# Patient Record
Sex: FEMALE | Race: WHITE
[De-identification: names, ages, dates, MRNs, and addresses within clinical notes are randomized per-mention and may not be internally consistent; named-entity substitution may affect disease eponyms.]

---

## 2020-06-10 ENCOUNTER — HOSPITAL ENCOUNTER (OUTPATIENT)
Dept: HOSPITAL 47 - LABPAT | Age: 55
Discharge: HOME | End: 2020-06-10
Attending: OBSTETRICS & GYNECOLOGY
Payer: COMMERCIAL

## 2020-06-10 DIAGNOSIS — Z01.818: Primary | ICD-10-CM

## 2020-06-10 DIAGNOSIS — D25.9: ICD-10-CM

## 2020-06-10 LAB
ANION GAP SERPL CALC-SCNC: 10 MMOL/L
BASOPHILS # BLD AUTO: 0 K/UL (ref 0–0.2)
BASOPHILS NFR BLD AUTO: 1 %
BUN SERPL-SCNC: 14 MG/DL (ref 7–17)
CHLORIDE SERPL-SCNC: 101 MMOL/L (ref 98–107)
CO2 SERPL-SCNC: 27 MMOL/L (ref 22–30)
EOSINOPHIL # BLD AUTO: 0 K/UL (ref 0–0.7)
EOSINOPHIL NFR BLD AUTO: 1 %
ERYTHROCYTE [DISTWIDTH] IN BLOOD BY AUTOMATED COUNT: 4.52 M/UL (ref 3.8–5.4)
ERYTHROCYTE [DISTWIDTH] IN BLOOD: 12.8 % (ref 11.5–15.5)
GLUCOSE SERPL-MCNC: 108 MG/DL (ref 74–99)
HCT VFR BLD AUTO: 42.3 % (ref 34–46)
HGB BLD-MCNC: 14.1 GM/DL (ref 11.4–16)
LYMPHOCYTES # SPEC AUTO: 1.4 K/UL (ref 1–4.8)
LYMPHOCYTES NFR SPEC AUTO: 23 %
MCH RBC QN AUTO: 31.2 PG (ref 25–35)
MCHC RBC AUTO-ENTMCNC: 33.3 G/DL (ref 31–37)
MCV RBC AUTO: 93.6 FL (ref 80–100)
MONOCYTES # BLD AUTO: 0.2 K/UL (ref 0–1)
MONOCYTES NFR BLD AUTO: 4 %
NEUTROPHILS # BLD AUTO: 4.2 K/UL (ref 1.3–7.7)
NEUTROPHILS NFR BLD AUTO: 70 %
PLATELET # BLD AUTO: 282 K/UL (ref 150–450)
POTASSIUM SERPL-SCNC: 4.4 MMOL/L (ref 3.5–5.1)
SODIUM SERPL-SCNC: 138 MMOL/L (ref 137–145)
WBC # BLD AUTO: 6 K/UL (ref 3.8–10.6)

## 2020-06-10 PROCEDURE — 36415 COLL VENOUS BLD VENIPUNCTURE: CPT

## 2020-06-10 PROCEDURE — 84520 ASSAY OF UREA NITROGEN: CPT

## 2020-06-10 PROCEDURE — 82947 ASSAY GLUCOSE BLOOD QUANT: CPT

## 2020-06-10 PROCEDURE — 87086 URINE CULTURE/COLONY COUNT: CPT

## 2020-06-10 PROCEDURE — 80051 ELECTROLYTE PANEL: CPT

## 2020-06-10 PROCEDURE — 85025 COMPLETE CBC W/AUTO DIFF WBC: CPT

## 2020-06-10 PROCEDURE — 82565 ASSAY OF CREATININE: CPT

## 2020-06-16 ENCOUNTER — HOSPITAL ENCOUNTER (OUTPATIENT)
Dept: HOSPITAL 47 - OR | Age: 55
Setting detail: OBSERVATION
LOS: 1 days | Discharge: HOME | End: 2020-06-17
Attending: OBSTETRICS & GYNECOLOGY | Admitting: OBSTETRICS & GYNECOLOGY
Payer: COMMERCIAL

## 2020-06-16 VITALS — BODY MASS INDEX: 30.2 KG/M2

## 2020-06-16 DIAGNOSIS — D25.9: Primary | ICD-10-CM

## 2020-06-16 DIAGNOSIS — I95.81: ICD-10-CM

## 2020-06-16 DIAGNOSIS — C55: ICD-10-CM

## 2020-06-16 DIAGNOSIS — Z87.891: ICD-10-CM

## 2020-06-16 DIAGNOSIS — N80.0: ICD-10-CM

## 2020-06-16 LAB
ERYTHROCYTE [DISTWIDTH] IN BLOOD BY AUTOMATED COUNT: 3.41 M/UL (ref 3.8–5.4)
ERYTHROCYTE [DISTWIDTH] IN BLOOD: 12.7 % (ref 11.5–15.5)
HCT VFR BLD AUTO: 32.3 % (ref 34–46)
HGB BLD-MCNC: 10.7 GM/DL (ref 11.4–16)
MCH RBC QN AUTO: 31.4 PG (ref 25–35)
MCHC RBC AUTO-ENTMCNC: 33.1 G/DL (ref 31–37)
MCV RBC AUTO: 94.8 FL (ref 80–100)
PLATELET # BLD AUTO: 275 K/UL (ref 150–450)
WBC # BLD AUTO: 14.8 K/UL (ref 3.8–10.6)

## 2020-06-16 PROCEDURE — 58260 VAGINAL HYSTERECTOMY: CPT

## 2020-06-16 PROCEDURE — 85027 COMPLETE CBC AUTOMATED: CPT

## 2020-06-16 PROCEDURE — 86901 BLOOD TYPING SEROLOGIC RH(D): CPT

## 2020-06-16 PROCEDURE — 88309 TISSUE EXAM BY PATHOLOGIST: CPT

## 2020-06-16 PROCEDURE — 86850 RBC ANTIBODY SCREEN: CPT

## 2020-06-16 PROCEDURE — 81025 URINE PREGNANCY TEST: CPT

## 2020-06-16 PROCEDURE — 86900 BLOOD TYPING SEROLOGIC ABO: CPT

## 2020-06-16 RX ADMIN — POTASSIUM CHLORIDE SCH MLS/HR: 14.9 INJECTION, SOLUTION INTRAVENOUS at 15:49

## 2020-06-16 RX ADMIN — ONDANSETRON PRN MG: 2 INJECTION INTRAMUSCULAR; INTRAVENOUS at 15:53

## 2020-06-16 RX ADMIN — POTASSIUM CHLORIDE SCH MLS: 14.9 INJECTION, SOLUTION INTRAVENOUS at 06:36

## 2020-06-16 NOTE — P.ANPRN
Procedure Note - Anesthesia





- Nerve Block Performed


  ** Other (see comment) Single


Date of Procedure: 06/16/20 (Spinal Duramorph/fentanyl)


Procedure Start Time: 06:53


Procedure Stop Time: 07:02


Location of Patient: PreOp


Indication: Acute Post-Operative Pain, Requested by Surgeon (Renny)


Sedation Type: Sedate with meaningful contact maintained


Preparation: Sterile Prep


Position: Sitting


Catheter: None


Needle Types: Other (see comment) (Eubanks)


Needle Gauge: Other (see comment) (25g)


Ultrasound used to visualize needle placement: No


Ultrasound used to observe medication spread: No


Injectate: Other (see comment) (300mcg duramorph, 25muc fentanyl)


Blood Aspirated: No


Pain Paresthesia on Injection Noted: No


Resistance on Injection: Normal


Image Stored and Saved: No


Events: Uneventful and Well Tolerated

## 2020-06-16 NOTE — P.OP
Date of Procedure: 20


Preoperative Diagnosis: 


Menorrhagia, fibroid uterus


Postoperative Diagnosis: 


Same, normal-appearing ovaries bilaterally


Procedure(s) Performed: 


Vaginal hysterectomy


Anesthesia: SALBADORA


Surgeon: Steffi Lawson


Assistant #1: Tanvir Lemus


Estimated Blood Loss (ml): 25


IV fluids (ml): 700


Urine output (ml): 125


Pathology: other (Cervix and uterus)


Condition: stable


Disposition: PACU


Description of Procedure: 


Patient is brought to the operating suite after spinal with Duramorph is placed.

 She's placed in the dorsal lithotomy position after general anesthetic is 

administered without difficulty.  The appropriate timeout is performed to assure

proper patient and procedural identification.  Antibiotics are given.  Urine hCG

is negative.  The cervix, perineal body, vagina are all prepped and draped in 

usual sterile fashion.  The bladder is drained for approximately 125 mL of clear

yellow urine.  The weighted speculum was placed into the vagina.  The anterior 

lip of the cervix is grasped with a double-tooth tenaculum.





The cervix is injected circumferentially with a dilute Pitressin solution, 10 

mL's.  A [place] blade scalpel is then used to incise the mucosa 

circumferentially with a V positioning at 6:00.  A sponge rolled finger is used 

to sweep the mucosa from the underlying fascial planes.  Special care is taken 

to at all times keep the mucosa swept well from the plains to avoid bladder 

and/or ureteral injury.  The peritoneum is entered at 6:00, suture tied with 2-0

Vicryl and held with a hemostat.  The large billed speculum is then placed into 

the peritoneal cavity.  The right uterosacral ligament is identified, clamped 

cut and held with a 0 Vicryl stitch laterally.  The same procedure is carried 

out contralaterally.  Careful dissection is used and the uterine vasculature is 

identified, clamped cut and suture ligated.  Again the bladder swept well from 

the operative field, no adhesions were encountered.  2 additional pedicles are 

taken superior to the vessels.  The anterior peritoneum was then entered at 

12:00.  The uterus is "walked out" posteriorly, it is large and misshapen with 

multiple fibroids.  Nadir clamps are used across the final pedicles.  The 

pedicles are cut, tied, flashed and retied for excellent hemostasis.





A sponge stick is then used and both ovaries are visualized, they are within 

normal limits and therefore left in situ per the patient's wishes preoperati

vely.  All pedicles again are clean and dry.  The 2-0 Vicryl stitch placed at 

6:00 is now brought around in a circumferential fashion to close the peritoneum.

 The previously held uterosacral cardinal ligaments are brought across now to 

incorporate the opposite ligament and vaginal mucosa.  2 additional 

figure-of-eight sutures are placed to finish vaginal cuff closure, both of 0 

Vicryl, one superior and one inferior to the uterosacral pedicles.  Again, 

hemostasis is excellent.  Jeong catheter is placed in the urine is clear.  

Decision to proceed with cystoscopy is aborted as no complications, no 

adhesions, no difficulties were encountered even after previous vertical 

 section years ago.





The vagina is packed with one-inch iodophor gauze with basic tracing.  Jeong is 

draining clear urine.  Patient is brought back to the recovery room in excellent

condition with stable vital signs including blood pressure 119/74, pulse 69.  

All sponge needle and instrument counts are correct.

## 2020-06-17 VITALS
TEMPERATURE: 98.3 F | DIASTOLIC BLOOD PRESSURE: 73 MMHG | SYSTOLIC BLOOD PRESSURE: 134 MMHG | RESPIRATION RATE: 16 BRPM | HEART RATE: 98 BPM

## 2020-06-17 RX ADMIN — ONDANSETRON PRN MG: 2 INJECTION INTRAMUSCULAR; INTRAVENOUS at 00:04

## 2020-06-17 NOTE — P.PN
Progress Note - Text


Progress Note Date: 06/17/20


Postoperative day 1 status post    hysterectomy    under general anesthesia with

intrathecal Duramorph for postoperative analgesia.The  patient is doing well, 

there is mild generalized skin itching.  The patient denies any paresthesia or 

weakness in the lower extremities  There are no other anesthesia related 

complications.  Further management as per the patient primary team.

## 2020-06-17 NOTE — P.DS
Providers


Date of admission: 


06/16/20 18:24





Expected date of discharge: 06/17/20


Attending physician: 


Steffi Lawson





Primary care physician: 


[facility]: 





This is a 54-year-old white female who presented with fibroid uterus and 

menorrhagia, status post ablation years ago, requesting vaginal hysterectomy.  

All risks benefits and alternatives were discussed in detail.  Patient elected 

to proceed with vaginal hysterectomy under my care.  Please see admitting H&P 

for details.





She underwent vaginal hysterectomy without issue, estimated blood loss 25 mL's. 

Ovaries appeared normal and were left in situ per her wishes.  She received a 

spinal with Duramorph for analgesic, please see dictated operative note for 

details.





Last night the patient did become hypotensive, corrected with fluid bolus.  This

morning the Jeong catheter has been discontinued and she is urinating freely.  

Vaginal packing is removed.  Vital signs are stable and she has remained 

afebrile.  There is no CVA tenderness, lower abdomen is soft and nontender, no 

rebound or guarding.  Extremities are negative for edema.  Chest is clear in all

fields.  Patient is judged to be in good condition for discharge home.





She will follow-up in the office with me in 2 weeks.  I have reminded her no 

intercourse, tampons or douching.  She will use over-the-counter Advil or Aleve 

as needed for pain.  She will call with any fevers shakes or chills, foul 

smelling or bloody vaginal drainage, with any issues with voiding or defecation,

or with any pain not alleviated by over-the-counter products.  She will call 

with any dizziness, or indeed with any systemic complaints or concerns.  No 

heavy lifting, no driving for 2 weeks.


Assessment: 


Doing well postoperative day #1


Patient Condition at Discharge: Good





Plan - Discharge Summary


New Discharge Prescriptions: 


No Action


   Acetaminophen [Tylenol Extra Strength] 500 mg PO AS DIRECTED PRN


     PRN Reason: Pain


   Multivit with Calcium,Iron,Min [Women's Multivitamin] 1 each PO DAILY


   Guaifen/Phenyleph/Acetaminophn [Tylenol Sinus Severe Caplet] 1 each PO AS 

DIRECTED PRN


     PRN Reason: SINUS SX


Discharge Medication List





Acetaminophen [Tylenol Extra Strength] 500 mg PO AS DIRECTED PRN 06/15/20 

[History]


Guaifen/Phenyleph/Acetaminophn [Tylenol Sinus Severe Caplet] 1 each PO AS 

DIRECTED PRN 06/15/20 [History]


Multivit with Calcium,Iron,Min [Women's Multivitamin] 1 each PO DAILY 06/15/20 

[History]








Follow up Appointment(s)/Referral(s): 


Steffi Lawson MD [STAFF PHYSICIAN] - 2 Weeks


Discharge Disposition: HOME SELF-CARE

## 2021-09-27 ENCOUNTER — HOSPITAL ENCOUNTER (OUTPATIENT)
Dept: HOSPITAL 47 - RADMAMWWP | Age: 56
Discharge: HOME | End: 2021-09-27
Attending: OBSTETRICS & GYNECOLOGY
Payer: COMMERCIAL

## 2021-09-27 DIAGNOSIS — Z12.31: Primary | ICD-10-CM

## 2021-09-27 DIAGNOSIS — Z78.0: ICD-10-CM

## 2021-09-27 PROCEDURE — 77067 SCR MAMMO BI INCL CAD: CPT

## 2021-09-27 PROCEDURE — 77063 BREAST TOMOSYNTHESIS BI: CPT

## 2021-09-28 NOTE — MM
Reason for exam: screening  (asymptomatic).

Last mammogram was performed 6 years and 1 month ago.



History:

Patient is postmenopausal and has history of other cancer at age 55.



Physical Findings:

A clinical breast exam by your physician is recommended on an annual basis and 

results should be correlated with mammographic findings.



MG 3D Screening Mammo W/Cad

Bilateral CC and MLO view(s) were taken.

Prior study comparison: August 13, 2015, mammogram, performed at [facility].  March 13, 2013, mammogram, performed at [facility].

The breast tissue is heterogeneously dense. This may lower the sensitivity of 

mammography.  There is no discrete abnormality.  No significant changes when 

compared with prior studies.





ASSESSMENT: Negative, BI-RAD 1



RECOMMENDATION:

Routine screening mammogram of both breasts in 1 year.